# Patient Record
Sex: MALE | Race: WHITE | Employment: OTHER | URBAN - METROPOLITAN AREA
[De-identification: names, ages, dates, MRNs, and addresses within clinical notes are randomized per-mention and may not be internally consistent; named-entity substitution may affect disease eponyms.]

---

## 2018-05-29 ENCOUNTER — OFFICE VISIT (OUTPATIENT)
Dept: HEMATOLOGY | Age: 58
End: 2018-05-29

## 2018-05-29 VITALS
HEART RATE: 96 BPM | SYSTOLIC BLOOD PRESSURE: 131 MMHG | TEMPERATURE: 99 F | DIASTOLIC BLOOD PRESSURE: 63 MMHG | WEIGHT: 229.8 LBS | OXYGEN SATURATION: 97 %

## 2018-05-29 DIAGNOSIS — R60.0 LOWER LEG EDEMA: ICD-10-CM

## 2018-05-29 DIAGNOSIS — K76.82 HEPATIC ENCEPHALOPATHY: ICD-10-CM

## 2018-05-29 DIAGNOSIS — F10.21 ALCOHOLISM IN REMISSION (HCC): ICD-10-CM

## 2018-05-29 DIAGNOSIS — K76.6 PORTAL HYPERTENSION (HCC): ICD-10-CM

## 2018-05-29 DIAGNOSIS — K42.9 UMBILICAL HERNIA WITHOUT OBSTRUCTION AND WITHOUT GANGRENE: ICD-10-CM

## 2018-05-29 DIAGNOSIS — K70.31 ASCITES DUE TO ALCOHOLIC CIRRHOSIS (HCC): ICD-10-CM

## 2018-05-29 DIAGNOSIS — F10.21 ALCOHOL USE DISORDER, MODERATE, IN EARLY REMISSION (HCC): Primary | ICD-10-CM

## 2018-05-29 RX ORDER — FUROSEMIDE 40 MG/1
TABLET ORAL DAILY
COMMUNITY

## 2018-05-29 RX ORDER — PROPRANOLOL HYDROCHLORIDE 10 MG/1
TABLET ORAL 3 TIMES DAILY
COMMUNITY

## 2018-05-29 RX ORDER — CALCIUM CARBONATE/VITAMIN D3 500-10/5ML
800 LIQUID (ML) ORAL
COMMUNITY
End: 2018-06-26 | Stop reason: SDUPTHER

## 2018-05-29 RX ORDER — LANOLIN ALCOHOL/MO/W.PET/CERES
CREAM (GRAM) TOPICAL DAILY
COMMUNITY

## 2018-05-29 RX ORDER — HYDROXYZINE PAMOATE 50 MG/1
50 CAPSULE ORAL
COMMUNITY
End: 2018-05-29 | Stop reason: ALTCHOICE

## 2018-05-29 RX ORDER — CALCIUM CARBONATE 200(500)MG
TABLET,CHEWABLE ORAL
COMMUNITY
Start: 2011-05-24 | End: 2018-05-29 | Stop reason: ALTCHOICE

## 2018-05-29 RX ORDER — FOLIC ACID 1 MG/1
1 TABLET ORAL
COMMUNITY
Start: 2018-02-24

## 2018-05-29 RX ORDER — HYDROXYZINE 50 MG/1
50 TABLET, FILM COATED ORAL
COMMUNITY

## 2018-05-29 RX ORDER — SPIRONOLACTONE 100 MG/1
100 TABLET, FILM COATED ORAL DAILY
Qty: 30 TAB | Refills: 3 | Status: SHIPPED | OUTPATIENT
Start: 2018-05-29 | End: 2018-06-26 | Stop reason: SDUPTHER

## 2018-05-29 RX ORDER — OXYCODONE HYDROCHLORIDE 5 MG/1
5 CAPSULE ORAL
COMMUNITY

## 2018-05-29 RX ORDER — BENZONATATE 100 MG/1
100 CAPSULE ORAL
COMMUNITY
End: 2018-05-29 | Stop reason: ALTCHOICE

## 2018-05-29 RX ORDER — OMEPRAZOLE 20 MG/1
20 CAPSULE, DELAYED RELEASE ORAL DAILY
COMMUNITY

## 2018-05-29 RX ORDER — LACTULOSE 10 G/15ML
20 SOLUTION ORAL; RECTAL
COMMUNITY
Start: 2018-02-23

## 2018-05-29 RX ORDER — LORAZEPAM 0.5 MG/1
0.5 TABLET ORAL
COMMUNITY

## 2018-05-29 RX ORDER — ALBUTEROL SULFATE 90 UG/1
2 AEROSOL, METERED RESPIRATORY (INHALATION)
COMMUNITY

## 2018-05-29 NOTE — PROGRESS NOTES
Chief Complaint   Patient presents with   174 [facility] Patient     Cirrhosis      Visit Vitals    /63 (BP 1 Location: Left arm, BP Patient Position: Sitting)    Pulse 96    Temp 99 °F (37.2 °C) (Tympanic)    Wt 229 lb 12.8 oz (104.2 kg)    SpO2 97%     PHQ over the last two weeks 5/29/2018   Little interest or pleasure in doing things Not at all   Feeling down, depressed or hopeless Not at all   Total Score PHQ 2 0     Learning Assessment 5/29/2018   PRIMARY LEARNER Patient   BARRIERS PRIMARY LEARNER NONE   CO-LEARNER CAREGIVER No   PRIMARY LANGUAGE ENGLISH   LEARNER PREFERENCE PRIMARY LISTENING   ANSWERED BY patient    RELATIONSHIP SELF     Abuse Screening Questionnaire 5/29/2018   Do you ever feel afraid of your partner? N   Are you in a relationship with someone who physically or mentally threatens you? N   Is it safe for you to go home?  Alec Lindo

## 2018-05-30 ENCOUNTER — HOSPITAL ENCOUNTER (OUTPATIENT)
Dept: MRI IMAGING | Age: 58
Discharge: HOME | End: 2018-05-30
Attending: PHYSICIAN ASSISTANT
Payer: MEDICARE

## 2018-05-30 ENCOUNTER — HOSPITAL ENCOUNTER (OUTPATIENT)
Dept: GENERAL RADIOLOGY | Age: 58
Discharge: HOME | End: 2018-05-30
Attending: PHYSICIAN ASSISTANT
Payer: MEDICARE

## 2018-05-30 DIAGNOSIS — M54.9 BACK PAIN: ICD-10-CM

## 2018-05-30 DIAGNOSIS — M50.10 CERVICAL DISC SYNDROME: ICD-10-CM

## 2018-05-30 DIAGNOSIS — R53.1 ASTHENIA: ICD-10-CM

## 2018-05-30 LAB
ALBUMIN SERPL-MCNC: 3.1 G/DL (ref 3.5–5.5)
ALBUMIN/GLOB SERPL: 0.8 {RATIO} (ref 1.2–2.2)
ALP SERPL-CCNC: 312 IU/L (ref 39–117)
ALT SERPL-CCNC: 16 IU/L (ref 0–44)
AMMONIA PLAS-MCNC: 201 UG/DL (ref 27–102)
AST SERPL-CCNC: 46 IU/L (ref 0–40)
BILIRUB SERPL-MCNC: 1.4 MG/DL (ref 0–1.2)
BUN SERPL-MCNC: 8 MG/DL (ref 6–24)
BUN/CREAT SERPL: 10 (ref 9–20)
CALCIUM SERPL-MCNC: 8.9 MG/DL (ref 8.7–10.2)
CHLORIDE SERPL-SCNC: 91 MMOL/L (ref 96–106)
CO2 SERPL-SCNC: 25 MMOL/L (ref 18–29)
CREAT SERPL-MCNC: 0.81 MG/DL (ref 0.76–1.27)
ERYTHROCYTE [DISTWIDTH] IN BLOOD BY AUTOMATED COUNT: 14.9 % (ref 12.3–15.4)
ETHANOL BLD GC-MCNC: NEGATIVE %
GFR SERPLBLD CREATININE-BSD FMLA CKD-EPI: 115 ML/MIN/1.73
GFR SERPLBLD CREATININE-BSD FMLA CKD-EPI: 99 ML/MIN/1.73
GLOBULIN SER CALC-MCNC: 4.1 G/DL (ref 1.5–4.5)
GLUCOSE SERPL-MCNC: 97 MG/DL (ref 65–99)
HCT VFR BLD AUTO: 30.9 % (ref 37.5–51)
HGB BLD-MCNC: 10.1 G/DL (ref 13–17.7)
INR PPP: 1.1 (ref 0.8–1.2)
MCH RBC QN AUTO: 31.6 PG (ref 26.6–33)
MCHC RBC AUTO-ENTMCNC: 32.7 G/DL (ref 31.5–35.7)
MCV RBC AUTO: 97 FL (ref 79–97)
PLATELET # BLD AUTO: 213 X10E3/UL (ref 150–379)
POTASSIUM SERPL-SCNC: 4.7 MMOL/L (ref 3.5–5.2)
PROT SERPL-MCNC: 7.2 G/DL (ref 6–8.5)
PROTHROMBIN TIME: 11.8 SEC (ref 9.1–12)
RBC # BLD AUTO: 3.2 X10E6/UL (ref 4.14–5.8)
SODIUM SERPL-SCNC: 131 MMOL/L (ref 134–144)
WBC # BLD AUTO: 9.4 X10E3/UL (ref 3.4–10.8)

## 2018-05-30 PROCEDURE — 72052 X-RAY EXAM NECK SPINE 6/>VWS: CPT

## 2018-05-30 PROCEDURE — 72100 X-RAY EXAM L-S SPINE 2/3 VWS: CPT

## 2018-05-31 PROBLEM — K42.9 UMBILICAL HERNIA WITHOUT OBSTRUCTION AND WITHOUT GANGRENE: Status: ACTIVE | Noted: 2018-05-31

## 2018-05-31 PROBLEM — R60.0 LOWER LEG EDEMA: Status: ACTIVE | Noted: 2018-05-31

## 2018-06-04 ENCOUNTER — HOSPITAL ENCOUNTER (OUTPATIENT)
Dept: MRI IMAGING | Age: 58
Discharge: HOME | End: 2018-06-04
Attending: PHYSICIAN ASSISTANT
Payer: MEDICARE

## 2018-06-04 ENCOUNTER — HOSPITAL ENCOUNTER (OUTPATIENT)
Dept: MRI IMAGING | Age: 58
Discharge: HOME | End: 2018-06-04
Attending: PAIN MEDICINE
Payer: MEDICARE

## 2018-06-04 ENCOUNTER — HOSPITAL ENCOUNTER (OUTPATIENT)
Dept: ULTRASOUND IMAGING | Age: 58
Discharge: HOME | End: 2018-06-04
Attending: NURSE PRACTITIONER
Payer: MEDICARE

## 2018-06-04 DIAGNOSIS — F10.21 ALCOHOLISM IN REMISSION (HCC): ICD-10-CM

## 2018-06-04 DIAGNOSIS — M48.56XA COLLAPSE OF LUMBAR VERTEBRA (HCC): ICD-10-CM

## 2018-06-04 DIAGNOSIS — K76.82 HEPATIC ENCEPHALOPATHY: ICD-10-CM

## 2018-06-04 DIAGNOSIS — K70.31 ASCITES DUE TO ALCOHOLIC CIRRHOSIS (HCC): ICD-10-CM

## 2018-06-04 DIAGNOSIS — F10.21 ALCOHOL USE DISORDER, MODERATE, IN EARLY REMISSION (HCC): ICD-10-CM

## 2018-06-04 PROCEDURE — 72141 MRI NECK SPINE W/O DYE: CPT

## 2018-06-04 PROCEDURE — 76705 ECHO EXAM OF ABDOMEN: CPT

## 2018-06-04 PROCEDURE — 72148 MRI LUMBAR SPINE W/O DYE: CPT

## 2018-06-26 RX ORDER — CALCIUM CARBONATE/VITAMIN D3 500-10/5ML
800 LIQUID (ML) ORAL DAILY
Qty: 90 CAP | Refills: 3 | Status: SHIPPED | OUTPATIENT
Start: 2018-06-26

## 2018-06-26 RX ORDER — SPIRONOLACTONE 100 MG/1
100 TABLET, FILM COATED ORAL DAILY
Qty: 30 TAB | Refills: 3 | Status: SHIPPED | OUTPATIENT
Start: 2018-06-26

## 2018-06-26 NOTE — TELEPHONE ENCOUNTER
Requested Prescriptions     Pending Prescriptions Disp Refills    spironolactone (ALDACTONE) 100 mg tablet 30 Tab 3     Sig: Take 1 Tab by mouth daily.  magnesium oxide 400 mg cap       Sig: Take 800 mg by mouth.

## 2018-07-02 ENCOUNTER — OFFICE VISIT (OUTPATIENT)
Dept: HEMATOLOGY | Age: 58
End: 2018-07-02

## 2018-07-02 VITALS
OXYGEN SATURATION: 96 % | HEART RATE: 97 BPM | DIASTOLIC BLOOD PRESSURE: 64 MMHG | TEMPERATURE: 97 F | SYSTOLIC BLOOD PRESSURE: 138 MMHG | WEIGHT: 219 LBS

## 2018-07-02 DIAGNOSIS — K42.9 UMBILICAL HERNIA WITHOUT OBSTRUCTION AND WITHOUT GANGRENE: ICD-10-CM

## 2018-07-02 DIAGNOSIS — F10.21 ALCOHOLISM IN REMISSION (HCC): ICD-10-CM

## 2018-07-02 DIAGNOSIS — D64.9 ANEMIA, UNSPECIFIED TYPE: ICD-10-CM

## 2018-07-02 DIAGNOSIS — K76.82 HEPATIC ENCEPHALOPATHY: ICD-10-CM

## 2018-07-02 DIAGNOSIS — R60.0 LOWER LEG EDEMA: ICD-10-CM

## 2018-07-02 DIAGNOSIS — F10.21 ALCOHOL USE DISORDER, MODERATE, IN EARLY REMISSION (HCC): Primary | ICD-10-CM

## 2018-07-02 NOTE — PROGRESS NOTES
1. Have you been to the ER, urgent care clinic since your last visit? Hospitalized since your last visit? No    2. Have you seen or consulted any other health care providers outside of the [address] [place] [address] since your last visit? Include any pap smears or colon screening. No   Chief Complaint   Patient presents with    Follow-up     4 week follow up      Visit Vitals    /64 (BP 1 Location: Left arm, BP Patient Position: Sitting)    Pulse 97    Temp 97 °F (36.1 °C) (Tympanic)    Wt 219 lb (99.3 kg)    SpO2 96%     PHQ over the last two weeks 7/2/2018   Little interest or pleasure in doing things Not at all   Feeling down, depressed or hopeless Not at all   Total Score PHQ 2 0     Learning Assessment 7/2/2018   PRIMARY LEARNER Patient   BARRIERS PRIMARY LEARNER NONE   CO-LEARNER CAREGIVER No   PRIMARY LANGUAGE ENGLISH   LEARNER PREFERENCE PRIMARY LISTENING   ANSWERED BY patient    RELATIONSHIP SELF     Abuse Screening Questionnaire 7/2/2018   Do you ever feel afraid of your partner? N   Are you in a relationship with someone who physically or mentally threatens you? N   Is it safe for you to go home?  Aldo Rodriguez

## 2018-07-02 NOTE — MR AVS SNAPSHOT
1111 [facility] Jero [phone] [address] [place] [address] 
[phone] Patient: Landen Cat MRN: [id number] HOL:[birth year] Visit Information Date & Time Provider Department Dept. Phone Encounter #  
 7/2/2018 12:45 PM April G Anival Mora, [facility] 219 [id number] Follow-up Instructions Return in about 4 weeks (around 7/30/2018). Your Appointments 8/2/2018  1:30 PM  
PROCEDURE with MD Willard De Jesus 75 3651 [facility]) Appt Note: LBX per Anival Mora 200 [facility] [phone] Critical access hospital [id number]  
59 [facility] Jero [address] Upcoming Health Maintenance Date Due Hepatitis C Screening 1960 Pneumococcal 19-64 Medium Risk (1 of 1 - PPSV23) 7/20/1980 DTaP/Tdap/Td series (1 - Tdap) 7/20/1982 FOBT Q 1 YEAR AGE 50-75 7/20/2011 MEDICARE YEARLY EXAM 4/24/2018 Influenza Age 5 to Adult 8/1/2018 Allergies as of 7/2/2018  Review Complete On: 7/2/2018 By: Aleyda Forrester Severity Noted Reaction Type Reactions Aspirin High 05/24/2011    Anaphylaxis Current Immunizations  Never Reviewed No immunizations on file. Not reviewed this visit You Were Diagnosed With   
  
 Codes Comments Alcohol use disorder, moderate, in early remission ([facility]    -  Primary ICD-10-CM: Y57.24 ICD-9-CM: 305.03 Alcoholism in remission [facility])     ICD-10-CM: D44.47 ICD-9-CM: 303.93 Lower leg edema     ICD-10-CM: R60.0 ICD-9-CM: 782.3 Hepatic encephalopathy ([facility]     ICD-10-CM: K72.90 ICD-9-CM: 572.2 Umbilical hernia without obstruction and without gangrene     ICD-10-CM: K42.9 ICD-9-CM: 553.1 Vitals BP Pulse Temp Weight(growth percentile) SpO2 Smoking Status 138/64 (BP 1 Location: Left arm, BP Patient Position: Sitting) 97 97 °F (36.1 °C) (Tympanic) 219 lb (99.3 kg) 96% Former Smoker Preferred Pharmacy Pharmacy Name Phone [id number] [place], [address] [facility] [phone] Your Updated Medication List  
  
   
This list is accurate as of 7/2/18  1:12 PM.  Always use your most recent med list.  
  
  
  
  
 albuterol 90 mcg/actuation inhaler Commonly known as:  PROVENTIL HFA, VENTOLIN HFA, PROAIR HFA Take 2 Puffs by inhalation. folic acid 1 mg tablet Commonly known as:  Google Take 1 mg by mouth. furosemide 40 mg tablet Commonly known as:  LASIX Take  by mouth daily. hydrOXYzine HCl 50 mg tablet Commonly known as:  ATARAX Take 50 mg by mouth three (3) times daily as needed for Itching. lactulose 10 gram/15 mL solution Commonly known as:  Leverne Manners Take 20 g by mouth. LORazepam 0.5 mg tablet Commonly known as:  ATIVAN Take 0.5 mg by mouth three (3) times daily as needed for Anxiety. Indications: anxiety  
  
 magnesium oxide 400 mg Cap Take 800 mg by mouth daily. omeprazole 20 mg capsule Commonly known as:  PRILOSEC Take 20 mg by mouth daily. oxyCODONE 5 mg capsule Commonly known as:  OXYIR Take 5 mg by mouth every four (4) hours as needed. propranolol 10 mg tablet Commonly known as:  INDERAL Take  by mouth three (3) times daily. [address] [place], [place] [address]way Take  by inhalation. spironolactone 100 mg tablet Commonly known as:  ALDACTONE Take 1 Tab by mouth daily. VITAMIN B-1 100 mg tablet Generic drug:  thiamine Take  by mouth daily. We Performed the Following AMMONIA [id number] CPT(R)] CBC W/O DIFF [10710 CPT(R)] FERRITIN [14421 CPT(R)] IRON PROFILE [id number] CPT(R)] METABOLIC PANEL, COMPREHENSIVE [19668 CPT(R)] PROTHROMBIN TIME + INR [43763 CPT(R)] Follow-up Instructions Return in about 4 weeks (around 7/30/2018). Introducing [facility] SERVICES! [facility] introduces Only-apartments patient portal. Now you can access parts of your medical record, email your doctor's office, and request medication refills online. 1. In your internet browser, go to [email or web address] 2. Click on the First Time User? Click Here link in the Sign In box. You will see the New Member Sign Up page. 3. Enter your Only-apartments Access Code exactly as it appears below. You will not need to use this code after youve completed the sign-up process. If you do not sign up before the expiration date, you must request a new code. · Only-apartments Access Code: [email or web address] Expires: 8/27/2018  2:41 PM 
 
4. Enter the last four digits of your Social Security Number (xxxx) and Date of Birth (mm/dd/yyyy) as indicated and click Submit. You will be taken to the next sign-up page. 5. Create a Only-apartments ID. This will be your Only-apartments login ID and cannot be changed, so think of one that is secure and easy to remember. 6. Create a Only-apartments password. You can change your password at any time. 7. Enter your Password Reset Question and Answer. This can be used at a later time if you forget your password. 8. Enter your e-mail address. You will receive e-mail notification when new information is available in [address] 9. Click Sign Up. You can now view and download portions of your medical record. 10. Click the Download Summary menu link to download a portable copy of your medical information. If you have questions, please visit the Frequently Asked Questions section of the Only-apartments website. Remember, Only-apartments is NOT to be used for urgent needs. For medical emergencies, dial 911. Now available from your iPhone and Android! Please provide this summary of care documentation to your next provider. Your primary care clinician is listed as Ivelisse [place]. If you have any questions after today's visit, please call [phone].

## 2018-07-03 LAB
ALBUMIN SERPL-MCNC: 3.6 G/DL (ref 3.5–5.5)
ALBUMIN/GLOB SERPL: 1 {RATIO} (ref 1.2–2.2)
ALP SERPL-CCNC: 240 IU/L (ref 39–117)
ALT SERPL-CCNC: 25 IU/L (ref 0–44)
AMMONIA PLAS-MCNC: 259 UG/DL (ref 27–102)
AST SERPL-CCNC: 51 IU/L (ref 0–40)
BILIRUB SERPL-MCNC: 1.3 MG/DL (ref 0–1.2)
BUN SERPL-MCNC: 14 MG/DL (ref 6–24)
BUN/CREAT SERPL: 12 (ref 9–20)
CALCIUM SERPL-MCNC: 9.4 MG/DL (ref 8.7–10.2)
CHLORIDE SERPL-SCNC: 83 MMOL/L (ref 96–106)
CO2 SERPL-SCNC: 23 MMOL/L (ref 20–29)
CREAT SERPL-MCNC: 1.15 MG/DL (ref 0.76–1.27)
ERYTHROCYTE [DISTWIDTH] IN BLOOD BY AUTOMATED COUNT: 15.3 % (ref 12.3–15.4)
FERRITIN SERPL-MCNC: 456 NG/ML (ref 30–400)
GLOBULIN SER CALC-MCNC: 3.6 G/DL (ref 1.5–4.5)
GLUCOSE SERPL-MCNC: 96 MG/DL (ref 65–99)
HCT VFR BLD AUTO: 29.3 % (ref 37.5–51)
HGB BLD-MCNC: 10 G/DL (ref 13–17.7)
INR PPP: 1.1 (ref 0.8–1.2)
IRON SATN MFR SERPL: 19 % (ref 15–55)
IRON SERPL-MCNC: 42 UG/DL (ref 38–169)
MCH RBC QN AUTO: 31.9 PG (ref 26.6–33)
MCHC RBC AUTO-ENTMCNC: 34.1 G/DL (ref 31.5–35.7)
MCV RBC AUTO: 94 FL (ref 79–97)
PLATELET # BLD AUTO: 247 X10E3/UL (ref 150–379)
POTASSIUM SERPL-SCNC: 5 MMOL/L (ref 3.5–5.2)
PROT SERPL-MCNC: 7.2 G/DL (ref 6–8.5)
PROTHROMBIN TIME: 11.5 SEC (ref 9.1–12)
RBC # BLD AUTO: 3.13 X10E6/UL (ref 4.14–5.8)
SODIUM SERPL-SCNC: 123 MMOL/L (ref 134–144)
TIBC SERPL-MCNC: 221 UG/DL (ref 250–450)
UIBC SERPL-MCNC: 179 UG/DL (ref 111–343)
WBC # BLD AUTO: 8.9 X10E3/UL (ref 3.4–10.8)

## 2018-07-03 NOTE — PROGRESS NOTES
Called patient to adjust diuretics. He will now take Lasix 20 mg and Aldactone 50 mg due to hyponatremia. Repeat labs in 2 weeks.     April

## 2018-07-05 PROBLEM — D64.9 ANEMIA: Status: ACTIVE | Noted: 2018-07-05

## 2018-07-05 NOTE — PROGRESS NOTES
70 Tri Mari MD, [address], [place], [place]       HAN Huerta PA-C Louetta Peace, MINDIP-BC   Librado Macias, HAN Patel [facility] [address]    at [place] [address] [address], [address] [address]  22.    [phone]    FAX: [address] [place] [address], [address]    [phone]    FAX: [phone]     Patient Care Team:  Michelle Faustin MD as PCP - General (Family Practice)  Robson Moreno MD as Physician (Gastroenterology)    Patient Active Problem List   Diagnosis Code    Alcohol use disorder, moderate, in early remission ([place] [facility] F10.21    Alcoholism in remission ([facility] F10.21    Hepatic encephalopathy ([facility] K72.90    Lower leg edema B50.3    Umbilical hernia without obstruction and without gangrene K42.9       Man Simon returns to the [employer] regarding management of cirrhosis secondary to alcohol. The active problem list, all pertinent past medical history, medications, radiologic findings and laboratory findings related to the liver disorder were reviewed with the patient. The patient is a 65 y.o.  male who was found to have chronic liver disease in 2013. At that time, he was drinking 6-12 beers daily. He was told to discontinue all alcohol use so he cut back at that time and eventually stopped. He then started drinking 2 glasses of wine daily in 2017. He developed bilateral LE edema in October 2017 but didn't know why. In February 2018, he fell, injured his knee and presented to the ED. He was found to have elevated liver enzymes and ascites. A paracentesis was required. He was hospitalized for 1 month.     An assessment of liver fibrosis with biopsy or elastography has not been performed. The patient has developed the following complications of cirrhosis:  ascites, LE edema and hepatic encephalopathy. The patient notes fatigue and umbilical hernia. These symptoms have not changed compared to last office visit. Today, patient denies abdominal pain, change in bowel habits, dark urine, myalgias, arthralgias, pruritus and problems concentrating. He has moderate limitations in functional activities which some can be attributed to the liver disease and to other medical problems that are not related to the liver disease. All of the issues listed in the Assessment and Plan were discussed with the patient. All questions were answered. The patient expressed a clear understanding of the above. ASSESSMENT AND PLAN:  Cirrhosis  This is secondary to alcohol abuse. Liver function is mildly depressed. Liver enzymes are mildly elevated. Platelet count is normal.  Child class B. The MELD score is 9. The patient does not require liver transplant evaluation at this time. The patient would not be a candidate for liver transplantation because of recent alcohol use in February 2018. The patient will have been abstinent for 6 months in August 2018. Ascites   There is no obvious ascites on exam today. Patient has had a 10 lbs weight loss over the last month with increasing his diuretics to step 1. He is hyponatremic so diuretics will be reduced to 1/2 step. He will repeat labs in 1-2 weeks. The patient was counseled regarding the need to maintain sodium restriction and the types of foods containing high amounts of sodium to be avoided. Lower extremity edema  Improved with step 1 diuretics. Reduced to 1/2 step due to hyponatremia. We discussed sodium restriction, not fluid restriction. He verbalized understanding of the importance of following a sodium restricted diet.     Hyponatremia  Sodium level is 123 today with increasing his diuretics to step 1 in May 2018. Instructed patient to reduce diuretics by half. Will recheck labs in 1-2 weeks to make sure this improves. Screening for Esophageal varices   The patient has not recently had an EGD to screen for varices. EGD has been scheduled for August 2018 to assess for varices and need for banding. Hepatic encephalopathy   Controlled on current doses of lactulose 30 mL BID. Continue. No need to restrict dietary protein at this time. Treatment of other medical problems in patients with chronic liver disease  The patient was directed to continue all current medications at the current dosages. The patient recently consumed alcohol on a regular basis. This increases the risk of toxicity from acetaminophen. This analgesic should be avoided until the patient has been abstinent from alcohol for 6 months. The patient has cirrhosis. Patients with cirrhosis should not use NSAIDs if possible as this is associated with a higher rate of VENU. Counseling for alcohol in patients with chronic liver disease  The patient has cirrhosis and was advised to be abstinent from all alcohol including non-alcoholic beer which does contain some alcohol. The patient has not consumed alcohol since February 2018. The patient has an alcohol abuse disorder and it was suggested to enter alcohol counseling or attend AA. Anemia   This is of unclear etiology. EGD has been scheduled to assess for UGI blood loss. It is not worsening over time. Vaccinations   Viral hepatitis B is recommended since the patient has no serologic evidence of previous exposure or vaccination with immunity. Vaccination for viral hepatitis A is not needed. The patient has serologic evidence of prior exposure or vaccination with immunity. Screening for Hepatocellular Carcinoma  HCC screening. AFP is normal. Currently up to date with an unremarkable US in June 2018. Next imaging will be in December 2018.     [facility] 87 in 4 weeks     ALLERGIES  Allergies   Allergen Reactions    Aspirin Anaphylaxis     MEDICATIONS  Current Outpatient Prescriptions   Medication Sig    spironolactone (ALDACTONE) 100 mg tablet Take 1 Tab by mouth daily.  magnesium oxide 400 mg cap Take 800 mg by mouth daily.  albuterol (PROVENTIL HFA, VENTOLIN HFA, PROAIR HFA) 90 mcg/actuation inhaler Take 2 Puffs by inhalation.  folic acid (FOLVITE) 1 mg tablet Take 1 mg by mouth.  lactulose (CHRONULAC) 10 gram/15 mL solution Take 20 g by mouth.  oxyCODONE (OXYIR) 5 mg capsule Take 5 mg by mouth every four (4) hours as needed.  hydrOXYzine HCl (ATARAX) 50 mg tablet Take 50 mg by mouth three (3) times daily as needed for Itching.  propranolol (INDERAL) 10 mg tablet Take  by mouth three (3) times daily.  thiamine (VITAMIN B-1) 100 mg tablet Take  by mouth daily.  furosemide (LASIX) 40 mg tablet Take  by mouth daily.  omeprazole (PRILOSEC) 20 mg capsule Take 20 mg by mouth daily.  tiotropium bromide (SPIRIVA WITH HANDIHALER IN) Take  by inhalation.  LORazepam (ATIVAN) 0.5 mg tablet Take 0.5 mg by mouth three (3) times daily as needed for Anxiety. Indications: anxiety     No current facility-administered medications for this visit. FAMILY HISTORY:  There is no family history of liver disease. Mother: alive, osteoarthritis  Father: , cardiovascular disease    SOCIAL HISTORY:  The patient is . The patient has 3 children and 2 grandchildren. The patient does not use tobacco products. The patient has previously consumed alcohol in excess, 6-12 beers x > 30 years. The patient has been abstinent from alcohol since 2018. The patient does not work. He used to work in construction. PHYSICAL EXAMINATION:  Visit Vitals    /64 (BP 1 Location: Left arm, BP Patient Position: Sitting)    Pulse 97    Temp 97 °F (36.1 °C) (Tympanic)    Wt 219 lb (99.3 kg)    SpO2 96%     General: No acute distress. Eyes: Sclera anicteric. ENT: No oral lesions. Thyroid normal.  Nodes: No adenopathy. Skin: No spider angiomata. No jaundice. No palmar erythema. Respiratory: Lungs clear to auscultation. Cardiovascular: Regular heart rate. No murmurs. No JVD. Abdomen: Umbilical hernia, no discoloration, reducible, non-tender. Liver size normal to percussion/palpation. Spleen not palpable. No obvious ascites. Extremities: No edema. No muscle wasting. No gross arthritic changes. Neurologic: Alert and oriented. Cranial nerves grossly intact. No asterixis. LABORATORY STUDIES:  [facility] [place] [address] & Units 7/2/2018 5/29/2018   WBC 3.4 - 10.8 x10E3/uL 8.9 9.4   HGB 13.0 - 17.7 g/dL 10.0 (L) 10.1 (L)    - 379 x10E3/uL 247 213   INR 0.8 - 1.2 1.1 1.1   AST 0 - 40 IU/L 51 (H) 46 (H)   ALT 0 - 44 IU/L 25 16   Alk Phos 39 - 117 IU/L 240 (H) 312 (H)   Bili, Total 0.0 - 1.2 mg/dL 1.3 (H) 1.4 (H)   Albumin 3.5 - 5.5 g/dL 3.6 3.1 (L)   BUN 6 - 24 mg/dL 14 8   Creat 0.76 - 1.27 mg/dL 1.15 0.81   Na 134 - 144 mmol/L 123 (L) 131 (L)   K 3.5 - 5.2 mmol/L 5.0 4.7   Cl 96 - 106 mmol/L 83 (L) 91 (L)   CO2 20 - 29 mmol/L 23 25   Glucose 65 - 99 mg/dL 96 97   Ammonia 27 - 102 ug/dL 259 (HH) 201 (HH)     SEROLOGIES:  4/19/2018:   Ceruloplasmin 28. 0/Ferritin 378/Iron saturation 36%/HAV Ab positive/HBVsAb non reactive/HBsAg negative/HCV Ab <0.1/SAADIA positive/ASMA 7 (negative)/AMA 14.0 (negative)/AFP 6.4    Serologies Latest Ref Rng & Units 7/2/2018   Ferritin 30 - 400 ng/mL 456 (H)   Iron % Saturation 15 - 55 % 19     LIVER HISTOLOGY:  Not available or performed    ENDOSCOPIC PROCEDURES:  4/2018. Colonoscopy by Dr. Kusum Boss. 5 mm polyp, hemorrhoids     RADIOLOGY:  6/2018. US of liver. Enlarged approximately 22 cm in length. Heterogeneous echotexture, no definite focal liver lesion. Main portal vein is patent. No ascites mentioned.      OTHER TESTING:  Not available or performed    April Rao Brown NP  [facility] [place] [facility], [facility], [address] [place] [address]  Ph: [phone]  Fax: [phone]

## 2018-07-20 ENCOUNTER — HOSPITAL ENCOUNTER (OUTPATIENT)
Dept: MRI IMAGING | Age: 58
Discharge: HOME | End: 2018-07-20
Attending: PAIN MEDICINE
Payer: MEDICARE

## 2018-07-20 ENCOUNTER — HOSPITAL ENCOUNTER (OUTPATIENT)
Dept: BONE DENSITY | Age: 58
Discharge: HOME | End: 2018-07-20
Attending: PAIN MEDICINE
Payer: MEDICARE

## 2018-07-20 DIAGNOSIS — M48.56XA COLLAPSE OF LUMBAR VERTEBRA (HCC): ICD-10-CM

## 2018-07-20 PROCEDURE — 72146 MRI CHEST SPINE W/O DYE: CPT

## 2018-07-20 PROCEDURE — 77080 DXA BONE DENSITY AXIAL: CPT

## 2018-08-28 ENCOUNTER — OFFICE VISIT (OUTPATIENT)
Dept: HEMATOLOGY | Age: 58
End: 2018-08-28

## 2018-08-28 VITALS
WEIGHT: 215 LBS | TEMPERATURE: 99 F | SYSTOLIC BLOOD PRESSURE: 130 MMHG | OXYGEN SATURATION: 97 % | HEART RATE: 104 BPM | DIASTOLIC BLOOD PRESSURE: 59 MMHG

## 2018-08-28 DIAGNOSIS — E87.1 HYPONATREMIA: ICD-10-CM

## 2018-08-28 DIAGNOSIS — K76.82 HEPATIC ENCEPHALOPATHY: ICD-10-CM

## 2018-08-28 DIAGNOSIS — D64.9 ANEMIA, UNSPECIFIED TYPE: ICD-10-CM

## 2018-08-28 DIAGNOSIS — F10.21 ALCOHOL USE DISORDER, MODERATE, IN EARLY REMISSION (HCC): Primary | ICD-10-CM

## 2018-08-28 DIAGNOSIS — K70.11 ASCITES DUE TO ALCOHOLIC HEPATITIS: ICD-10-CM

## 2018-08-28 DIAGNOSIS — R60.0 LOWER LEG EDEMA: ICD-10-CM

## 2018-08-28 DIAGNOSIS — K42.9 UMBILICAL HERNIA WITHOUT OBSTRUCTION AND WITHOUT GANGRENE: ICD-10-CM

## 2018-08-28 DIAGNOSIS — F10.21 ALCOHOLISM IN REMISSION (HCC): ICD-10-CM

## 2018-08-28 RX ORDER — TIOTROPIUM BROMIDE 18 UG/1
CAPSULE ORAL; RESPIRATORY (INHALATION)
Refills: 0 | COMMUNITY
Start: 2018-08-21

## 2018-08-28 RX ORDER — MOMETASONE FUROATE AND FORMOTEROL FUMARATE DIHYDRATE 200; 5 UG/1; UG/1
AEROSOL RESPIRATORY (INHALATION)
Refills: 0 | COMMUNITY
Start: 2018-08-20

## 2018-08-28 RX ORDER — MORPHINE SULFATE 15 MG/1
TABLET, FILM COATED, EXTENDED RELEASE ORAL
Refills: 0 | COMMUNITY
Start: 2018-08-22

## 2018-08-28 RX ORDER — OXYCODONE HYDROCHLORIDE 10 MG/1
TABLET ORAL
Refills: 0 | COMMUNITY
Start: 2018-08-22 | End: 2018-08-28 | Stop reason: DRUGHIGH

## 2018-08-28 RX ORDER — TORSEMIDE 20 MG/1
TABLET ORAL
Refills: 4 | COMMUNITY
Start: 2018-08-22

## 2018-08-28 NOTE — PROGRESS NOTES
1. Have you been to the ER, urgent care clinic since your last visit? Hospitalized since your last visit? No    2. Have you seen or consulted any other health care providers outside of the [facility] since your last visit? Include any pap smears or colon screening. No   Chief Complaint   Patient presents with    Follow-up     Visit Vitals    /59 (BP 1 Location: Left arm, BP Patient Position: Sitting)    Pulse (!) 104    Temp 99 °F (37.2 °C) (Tympanic)    Wt 215 lb (97.5 kg)    SpO2 97%     PHQ over the last two weeks 8/28/2018   Little interest or pleasure in doing things Not at all   Feeling down, depressed, irritable, or hopeless Not at all   Total Score PHQ 2 0     Learning Assessment 8/28/2018   PRIMARY LEARNER Patient   BARRIERS PRIMARY LEARNER NONE   CO-LEARNER CAREGIVER No   PRIMARY LANGUAGE ENGLISH   LEARNER PREFERENCE PRIMARY LISTENING   ANSWERED BY patient    RELATIONSHIP SELF     Abuse Screening Questionnaire 8/28/2018   Do you ever feel afraid of your partner? N   Are you in a relationship with someone who physically or mentally threatens you? N   Is it safe for you to go home?

## 2018-08-28 NOTE — MR AVS SNAPSHOT
2700 [facility] [phone] [address] [place] [address] 
[phone] Patient: Lizzie Aguirre MRN: [id number] AFE:[id number] Visit Information Date & Time Provider Department Dept. Phone Encounter #  
 8/28/2018  1:30 PM Vandana Markham, 3687 [facility] 219 [id number] Follow-up Instructions Return in about 2 months (around 10/28/2018). Your Appointments 10/25/2018 12:45 PM  
Follow Up with HAN Medrano 75 (3651 [facility]) Appt Note: Follow up 200 [facility] [phone] [facility] [id number]  
59 [facility] Upcoming Health Maintenance Date Due Pneumococcal 19-64 Medium Risk (1 of 1 - PPSV23) 7/20/1980 DTaP/Tdap/Td series (1 - Tdap) 7/20/1982 FOBT Q 1 YEAR AGE 50-75 7/20/2011 MEDICARE YEARLY EXAM 4/24/2018 Influenza Age 5 to Adult 8/1/2018 Allergies as of 8/28/2018  Review Complete On: 8/28/2018 By: Vandana Nicole NP Severity Noted Reaction Type Reactions Aspirin High 05/24/2011    Anaphylaxis Current Immunizations  Never Reviewed No immunizations on file. Not reviewed this visit You Were Diagnosed With   
  
 Codes Comments Alcohol use disorder, moderate, in early remission ([facility]    -  Primary ICD-10-CM: V26.55 ICD-9-CM: 305.03 Hepatic encephalopathy ([facility].)     ICD-10-CM: K72.90 ICD-9-CM: 572.2 Lower leg edema     ICD-10-CM: R60.0 ICD-9-CM: 832. 3 Umbilical hernia without obstruction and without gangrene     ICD-10-CM: K42.9 ICD-9-CM: 553.1 Alcoholism in remission [facility])     ICD-10-CM: H66.12 ICD-9-CM: 303.93 Ascites due to alcoholic hepatitis     [facility]-14-BD: K70.11 ICD-9-CM: 789.59 Vitals BP Pulse Temp Weight(growth percentile) SpO2 Smoking Status  130/59 (BP 1 Location: Left arm, BP Patient Position: Sitting) (!) 104 99 °F (37.2 °C) (Tympanic) 215 lb (97.5 kg) 97% Former Smoker Preferred Pharmacy Pharmacy Name Phone [facility][id number] Desirae Galeano, [address] [facility] [phone] Your Updated Medication List  
  
   
This list is accurate as of 8/28/18  1:50 PM.  Always use your most recent med list.  
  
  
  
  
 albuterol 90 mcg/actuation inhaler Commonly known as:  PROVENTIL HFA, VENTOLIN HFA, PROAIR HFA Take 2 Puffs by inhalation. DULERA 200-5 mcg/actuation HFA inhaler Generic drug:  mometasone-formoterol INHALE 2 PUFFS BY MOUTH TWICE DAILY  
  
 folic acid 1 mg tablet Commonly known as:  Google Take 1 mg by mouth. furosemide 40 mg tablet Commonly known as:  LASIX Take  by mouth daily. hydrOXYzine HCl 50 mg tablet Commonly known as:  ATARAX Take 50 mg by mouth three (3) times daily as needed for Itching. lactulose 10 gram/15 mL solution Commonly known as:  Swansea Hoots Take 20 g by mouth. LORazepam 0.5 mg tablet Commonly known as:  ATIVAN Take 0.5 mg by mouth three (3) times daily as needed for Anxiety. Indications: anxiety  
  
 magnesium oxide 400 mg Cap Take 800 mg by mouth daily. morphine CR 15 mg CR tablet Commonly known as:  MS CONTIN  
TAKE 1 TABLET BY MOUTH EVERY DAY  
  
 omeprazole 20 mg capsule Commonly known as:  PRILOSEC Take 20 mg by mouth daily. oxyCODONE 5 mg capsule Commonly known as:  OXYIR Take 5 mg by mouth every four (4) hours as needed. propranolol 10 mg tablet Commonly known as:  INDERAL Take  by mouth three (3) times daily. * [address] [place] [address] Take  by inhalation. * SPIRIVA WITH HANDIHALER 18 mcg inhalation capsule Generic drug:  tiotropium 1 CAPSULE ONCE A DAY INHALATION 30  
  
 spironolactone 100 mg tablet Commonly known as:  ALDACTONE Take 1 Tab by mouth daily. torsemide 20 mg tablet Commonly known as:  DEMADEX TAKE 1 TABLET BY MOUTH TWICE A DAY  
  
 VITAMIN B-1 100 mg tablet Generic drug:  thiamine HCL Take  by mouth daily. * Notice: This list has 2 medication(s) that are the same as other medications prescribed for you. Read the directions carefully, and ask your doctor or other care provider to review them with you. We Performed the Following AMMONIA [id number] CPT(R)] CBC W/O DIFF [61392 CPT(R)] CELL COUNT AND DIFF, BODY FLUID [29116 CPT(R)] MAGNESIUM [id number] CPT(R)] METABOLIC PANEL, COMPREHENSIVE [42910 CPT(R)] PROTHROMBIN TIME + INR [12218 CPT(R)] Follow-up Instructions Return in about 2 months (around 10/28/2018). To-Do List   
 08/28/2018 Imaging:  US PARACENTESIS ABD W IMAGING   
  
 09/06/2018 1:00 PM  
  Appointment with Vitaliy Gao MD; [facility] US ER 1 at [address] ([phone] GENERAL INSTRUCTIONS 1. Bring any non [facility] facility films/reports pertaining to the area being studied with you on the day of appointment. 2. Bring a list of all medications you are currently taking, including over the counter medications. 3. A written order with a valid diagnosis and Physicians signature is required for all scheduled tests. 4. Blood thinners and platelet inhibitors should be stopped 3-5 days prior to procedure. Consult your ordering physician prior to stopping them. 5. Check in at registration 30 minutes before your appointment time unless you were instructed to do otherwise. 6. A  is required for the procedure. For some patients, there may be some unsteadiness post procedure. If no  is present, the patient may have to remain in the department for a longer period of time. 7. The procedure may last 1-1 ½ hours. You may be required to stay 4-6 hours after the procedure for observation.   --Lab work for bleeding times (INR, PT , PTT and platelets) should be be completed at least the day before the exam.  Without this information the patient is delayed or  rescheduled. Introducing [facility]! [employer] introduces [employer] patient portal. Now you can access parts of your medical record, email your doctor's office, and request medication refills online. 1. In your internet browser, go to [email or web address] 2. Click on the First Time User? Click Here link in the Sign In box. You will see the New Member Sign Up page. 3. Enter your [employer] Access Code exactly as it appears below. You will not need to use this code after youve completed the sign-up process. If you do not sign up before the expiration date, you must request a new code. · [employer] Access Code: [email or web address] Expires: 11/26/2018  1:50 PM 
 
4. Enter the last four digits of your Social Security Number (xxxx) and Date of Birth (mm/dd/yyyy) as indicated and click Submit. You will be taken to the next sign-up page. 5. Create a [employer] ID. This will be your [employer] login ID and cannot be changed, so think of one that is secure and easy to remember. 6. Create a [employer] password. You can change your password at any time. 7. Enter your Password Reset Question and Answer. This can be used at a later time if you forget your password. 8. Enter your e-mail address. You will receive e-mail notification when new information is available in [address] 9. Click Sign Up. You can now view and download portions of your medical record. 10. Click the Download Summary menu link to download a portable copy of your medical information. If you have questions, please visit the Frequently Asked Questions section of the [employer] website. Remember, [employer] is NOT to be used for urgent needs. For medical emergencies, dial 911. Now available from your iPhone and Android! Please provide this summary of care documentation to your next provider. Your primary care clinician is listed as Simba Mares.  If you have any questions after today's visit, please call [phone].

## 2018-08-28 NOTE — PROGRESS NOTES
70 Tri Mari MD, [address], [facility], [place]       Mignon Like, NP    JESSICA Stone, [facility]   Karlos Morales, HAN Dominguez, HAN Jacques [facility] 136    at [address]    [address] [facility] [address], [address] [facility]    [address] [facility], [facility].    [phone]    FAX: [address] [place] [address] Avenue    [address], [address]    [phone]    FAX: [phone]     Patient Care Team:  Keyanna London MD as PCP - General (Family Practice)  Janie Oreilly MD as Physician (Gastroenterology)    Patient Active Problem List   Diagnosis Code    Alcohol use disorder, moderate, in early remission ([facility] F10.21    Alcoholism in remission ([facility] F10.21    Hepatic encephalopathy ([facility] K72.90    Lower leg edema U29.7    Umbilical hernia without obstruction and without gangrene K42.9    Anemia D64.9       Man Simon returns to the [address] [address] regarding management of cirrhosis secondary to alcohol. The active problem list, all pertinent past medical history, medications, radiologic findings and laboratory findings related to the liver disorder were reviewed with the patient. The patient is a 63 y.o.  male who was found to have chronic liver disease in 2013. At that time, he was drinking 6-12 beers daily. He was told to discontinue all alcohol use so he cut back at that time and eventually stopped. He then started drinking 2 glasses of wine daily in 2017. He developed bilateral LE edema in October 2017 but 'didn't know why'. In February 2018, he fell, injured his knee and presented to the ED. He was found to have elevated liver enzymes and ascites. A paracentesis was required. He was hospitalized for 1 month.       Patient denies any alcohol use today.     The patient has developed the following complications of cirrhosis: ascites, LE edema and hepatic encephalopathy. Today, patient notes fatigue, abdominal swelling and umbilical hernia. These symptoms have not changed compared to last office visit. Today, patient denies abdominal pain, change in bowel habits, dark urine, myalgias, arthralgias, pruritus and problems concentrating. He has moderate limitations in functional activities which some can be attributed to the liver disease and to other medical problems that are not related to the liver disease. ASSESSMENT AND PLAN:  Cirrhosis  This is secondary to alcohol abuse. Liver function is normal. Liver enzymes are normal. Alk phos is elevated. Platelet count is normal.    Child class B. The MELD score is 9. The patient does not require liver transplant evaluation at this time. Ascites   Patient's abdominal is distended today but still somewhat soft. Due to hyponatremia, diuretics will remain at 1/2 step. Sodium level is better today. Ordered a paracentesis to assess and drain all fluid since diuretics are limited to low dose. The patient was counseled regarding the need to maintain sodium restriction and the types of foods containing high amounts of sodium to be avoided. Lower extremity edema  Improved with diuretics. We discussed sodium restriction, not fluid restriction. He verbalized understanding of the importance of following a sodium restricted diet. Hyponatremia  Persists but significantly improved with diuretic reduction. Will continue to follow patient closely. Screening for Esophageal varices   The patient has not recently had an EGD to screen for varices. This was scheduled but cancelled by patient. Discussed the need to get this done. Hepatic encephalopathy   Controlled on current doses of lactulose 30 mL BID. Continue. No need to restrict dietary protein at this time.       Treatment of other medical problems in patients with chronic liver disease  The patient was directed to continue all current medications at the current dosages. The patient recently consumed alcohol on a regular basis. This increases the risk of toxicity from acetaminophen. This analgesic should be avoided until the patient has been abstinent from alcohol for 6 months. The patient has cirrhosis. Patients with cirrhosis should not use NSAIDs if possible as this is associated with a higher rate of VENU. Counseling for alcohol in patients with chronic liver disease  The patient has cirrhosis and was advised to be abstinent from all alcohol including non-alcoholic beer which does contain some alcohol. The patient has not consumed alcohol since February 2018. The patient has an alcohol abuse disorder and it was suggested to enter alcohol counseling or attend AA. Anemia   This is of unclear etiology. EGD has been scheduled to assess for UGI blood loss but cancelled by patient. Anemia is stable. Encouraged patient to get this rescheduled. Vaccinations   Viral hepatitis B is recommended since the patient has no serologic evidence of previous exposure or vaccination with immunity. Vaccination for viral hepatitis A is not needed. The patient has serologic evidence of prior exposure or vaccination with immunity. Screening for Hepatocellular Carcinoma  HCC screening. AFP is normal. Currently up to date with an unremarkable US in June 2018. Next imaging will be in December 2018.     ALLERGIES  Allergies   Allergen Reactions    Aspirin Anaphylaxis     MEDICATIONS  Current Outpatient Prescriptions   Medication Sig    morphine CR (MS CONTIN) 15 mg CR tablet TAKE 1 TABLET BY MOUTH EVERY DAY    SPIRIVA WITH HANDIHALER 18 mcg inhalation capsule 1 CAPSULE ONCE A DAY INHALATION 30    torsemide (DEMADEX) 20 mg tablet TAKE 1 TABLET BY MOUTH TWICE A DAY    DULERA 200-5 mcg/actuation HFA inhaler INHALE 2 PUFFS BY MOUTH TWICE DAILY    spironolactone (ALDACTONE) 100 mg tablet Take 1 Tab by mouth daily.  magnesium oxide 400 mg cap Take 800 mg by mouth daily.  albuterol (PROVENTIL HFA, VENTOLIN HFA, PROAIR HFA) 90 mcg/actuation inhaler Take 2 Puffs by inhalation.  folic acid (FOLVITE) 1 mg tablet Take 1 mg by mouth.  lactulose (CHRONULAC) 10 gram/15 mL solution Take 20 g by mouth.  oxyCODONE (OXYIR) 5 mg capsule Take 5 mg by mouth every four (4) hours as needed.  hydrOXYzine HCl (ATARAX) 50 mg tablet Take 50 mg by mouth three (3) times daily as needed for Itching.  propranolol (INDERAL) 10 mg tablet Take  by mouth three (3) times daily.  thiamine (VITAMIN B-1) 100 mg tablet Take  by mouth daily.  furosemide (LASIX) 40 mg tablet Take  by mouth daily.  omeprazole (PRILOSEC) 20 mg capsule Take 20 mg by mouth daily.  tiotropium bromide (SPIRIVA WITH HANDIHALER IN) Take  by inhalation.  LORazepam (ATIVAN) 0.5 mg tablet Take 0.5 mg by mouth three (3) times daily as needed for Anxiety. Indications: anxiety     No current facility-administered medications for this visit. FAMILY HISTORY:  There is no family history of liver disease. Mother: alive, osteoarthritis  Father: , cardiovascular disease    SOCIAL HISTORY:  The patient is . The patient has 3 children and 2 grandchildren. The patient does not use tobacco products. The patient has previously consumed alcohol in excess, 6-12 beers x > 30 years. The patient has been abstinent from alcohol since 2018. The patient does not work. He used to work in construction. PHYSICAL EXAMINATION:  Visit Vitals    /59 (BP 1 Location: Left arm, BP Patient Position: Sitting)    Pulse (!) 104    Temp 99 °F (37.2 °C) (Tympanic)    Wt 215 lb (97.5 kg)    SpO2 97%     General: No acute distress. Eyes: Sclera anicteric. ENT: No oral lesions. Thyroid normal.  Nodes: No adenopathy. Skin: No spider angiomata. No jaundice.  No palmar erythema. Respiratory: Lungs clear to auscultation. Cardiovascular: Regular heart rate. No murmurs. No JVD. Abdomen: Umbilical hernia, no discoloration, reducible, non-tender. Distended abdomen, ascites may be present. Liver size normal to percussion/palpation. Spleen not palpable. Extremities: No edema. No muscle wasting. No gross arthritic changes. Neurologic: Alert and oriented. Cranial nerves grossly intact. No asterixis. LABORATORY STUDIES:  [facility] [place] of [address] Units 8/28/2018 7/2/2018 5/29/2018   WBC 3.4 - 10.8 x10E3/uL 8.5 8.9 9.4   HGB 13.0 - 17.7 g/dL 9.6 (L) 10.0 (L) 10.1 (L)    - 379 x10E3/uL 177 247 213   INR 0.8 - 1.2 1.1 1.1 1.1   AST 0 - 40 IU/L 35 51 (H) 46 (H)   ALT 0 - 44 IU/L 16 25 16   Alk Phos 39 - 117 IU/L 287 (H) 240 (H) 312 (H)   Bili, Total 0.0 - 1.2 mg/dL 1.0 1.3 (H) 1.4 (H)   Albumin 3.5 - 5.5 g/dL 3.6 3.6 3.1 (L)   BUN 6 - 24 mg/dL 9 14 8   Creat 0.76 - 1.27 mg/dL 0.91 1.15 0.81   Na 134 - 144 mmol/L 128 (L) 123 (L) 131 (L)   K 3.5 - 5.2 mmol/L 4.6 5.0 4.7   Cl 96 - 106 mmol/L 90 (L) 83 (L) 91 (L)   CO2 20 - 29 mmol/L 19 (L) 23 25   Glucose 65 - 99 mg/dL 84 96 97   Magnesium 1.6 - 2.3 mg/dL 1.8     Ammonia 27 - 102 ug/dL 178 (HH) 259 (HH) 201 (HH)     SEROLOGIES:  4/19/2018:   Ceruloplasmin 28. 0/Ferritin 378/Iron saturation 36%/HAV Ab positive/HBVsAb non reactive/HBsAg negative/HCV Ab <0.1/SAADIA positive/ASMA 7 (negative)/AMA 14.0 (negative)/AFP 6.4    Serologies Latest Ref Rng & Units 7/2/2018   Ferritin 30 - 400 ng/mL 456 (H)   Iron % Saturation 15 - 55 % 19     LIVER HISTOLOGY:  Not available or performed    ENDOSCOPIC PROCEDURES:  4/2018. Colonoscopy by Dr. Telly Nielsen. 5 mm polyp, hemorrhoids     RADIOLOGY:  6/2018. US of liver. Enlarged approximately 22 cm in length. Heterogeneous echotexture, no definite focal liver lesion. Main portal vein is patent. No ascites mentioned.      OTHER TESTING:  Not available or performed    All of the issues listed in the Assessment and Plan were discussed with the patient. All questions were answered. The patient expressed a clear understanding of the above.     [address] [place] [address] in 4-8 weeks     April Oscar Shoemaker NP  [facility] [place] [address]  Ph: [phone]  Fax: [phone]

## 2018-08-29 LAB
ALBUMIN SERPL-MCNC: 3.6 G/DL (ref 3.5–5.5)
ALBUMIN/GLOB SERPL: 1.1 {RATIO} (ref 1.2–2.2)
ALP SERPL-CCNC: 287 IU/L (ref 39–117)
ALT SERPL-CCNC: 16 IU/L (ref 0–44)
AMMONIA PLAS-MCNC: 178 UG/DL (ref 27–102)
AST SERPL-CCNC: 35 IU/L (ref 0–40)
BILIRUB SERPL-MCNC: 1 MG/DL (ref 0–1.2)
BUN SERPL-MCNC: 9 MG/DL (ref 6–24)
BUN/CREAT SERPL: 10 (ref 9–20)
CALCIUM SERPL-MCNC: 8.8 MG/DL (ref 8.7–10.2)
CHLORIDE SERPL-SCNC: 90 MMOL/L (ref 96–106)
CO2 SERPL-SCNC: 19 MMOL/L (ref 20–29)
CREAT SERPL-MCNC: 0.91 MG/DL (ref 0.76–1.27)
ERYTHROCYTE [DISTWIDTH] IN BLOOD BY AUTOMATED COUNT: 14.6 % (ref 12.3–15.4)
GLOBULIN SER CALC-MCNC: 3.3 G/DL (ref 1.5–4.5)
GLUCOSE SERPL-MCNC: 84 MG/DL (ref 65–99)
HCT VFR BLD AUTO: 28.8 % (ref 37.5–51)
HGB BLD-MCNC: 9.6 G/DL (ref 13–17.7)
INR PPP: 1.1 (ref 0.8–1.2)
MAGNESIUM SERPL-MCNC: 1.8 MG/DL (ref 1.6–2.3)
MCH RBC QN AUTO: 31.2 PG (ref 26.6–33)
MCHC RBC AUTO-ENTMCNC: 33.3 G/DL (ref 31.5–35.7)
MCV RBC AUTO: 94 FL (ref 79–97)
PLATELET # BLD AUTO: 177 X10E3/UL (ref 150–379)
POTASSIUM SERPL-SCNC: 4.6 MMOL/L (ref 3.5–5.2)
PROT SERPL-MCNC: 6.9 G/DL (ref 6–8.5)
PROTHROMBIN TIME: 11.3 SEC (ref 9.1–12)
RBC # BLD AUTO: 3.08 X10E6/UL (ref 4.14–5.8)
SODIUM SERPL-SCNC: 128 MMOL/L (ref 134–144)
WBC # BLD AUTO: 8.5 X10E3/UL (ref 3.4–10.8)

## 2018-08-31 LAB
COLOR FLD: NORMAL
LYMPHOCYTES NFR FLD MANUAL: NORMAL %
MACROPHAGES NFR FLD MANUAL: NORMAL %
NEUTROPHILS NFR FLD MANUAL: NORMAL %
NUC CELL # FLD: NORMAL 10*3/UL
RBC # FLD AUTO: NORMAL 10*3/UL

## 2018-09-06 ENCOUNTER — HOSPITAL ENCOUNTER (OUTPATIENT)
Dept: ULTRASOUND IMAGING | Age: 58
Discharge: HOME | End: 2018-09-06
Attending: NURSE PRACTITIONER
Payer: MEDICARE

## 2018-09-06 VITALS — TEMPERATURE: 98.9 F

## 2018-09-06 DIAGNOSIS — K42.9 UMBILICAL HERNIA WITHOUT OBSTRUCTION AND WITHOUT GANGRENE: ICD-10-CM

## 2018-09-06 DIAGNOSIS — F10.21 ALCOHOLISM IN REMISSION (HCC): ICD-10-CM

## 2018-09-06 DIAGNOSIS — K76.82 HEPATIC ENCEPHALOPATHY: ICD-10-CM

## 2018-09-06 DIAGNOSIS — F10.21 ALCOHOL USE DISORDER, MODERATE, IN EARLY REMISSION (HCC): ICD-10-CM

## 2018-09-06 DIAGNOSIS — K70.11 ASCITES DUE TO ALCOHOLIC HEPATITIS: ICD-10-CM

## 2018-09-06 PROCEDURE — 76705 ECHO EXAM OF ABDOMEN: CPT

## 2018-09-06 RX ORDER — LIDOCAINE HYDROCHLORIDE 10 MG/ML
5 INJECTION, SOLUTION EPIDURAL; INFILTRATION; INTRACAUDAL; PERINEURAL
Status: ACTIVE | OUTPATIENT
Start: 2018-09-06 | End: 2018-09-07

## 2018-10-15 ENCOUNTER — DOCUMENTATION ONLY (OUTPATIENT)
Dept: INTERNAL MEDICINE CLINIC | Age: 58
End: 2018-10-15

## 2018-10-15 NOTE — PROGRESS NOTES
Representative came in to office requesting to drop off Death Certificate, Dr. Oleg Gallagher does not work at this location. Assisted rep with information for patient and address for Dr. Oleg Gallagher.

## 2024-01-01 NOTE — PROGRESS NOTES
[address] MD, [place], [facility], [place]       Kash Richter, JESSICA Grace, [facility] PhoenixJOSE ANTONIO-BC   Nuzhat Isabel, HAN Desai, HAN Corbin [facility]    at [address], [address] [place]  22.    [phone]    FAX: [address] [place] [address], [address]    [phone]    FAX: [phone]     Patient Care Team:  Amy Byrne MD as PCP - General (Family Practice)  Greta Bedolla MD as Physician (Gastroenterology)    Patient Active Problem List   Diagnosis Code    Alcohol use disorder, moderate, in early remission ([facility] [address] [facility] F10.21    Alcoholism in remission [facility]) F10.21    [facility]atic [facility] ([facility] K72.90       The clinicians listed above have asked me to see Josh Lozano in consultation regarding management of cirrhosis secondary to alcohol. All medical records sent by the referring physicians were reviewed. The patient is a 65 y.o.  male who was found to have chronic liver disease in 2013. At that time, he was drinking 6-12 beers daily. He was told to discontinue all alcohol use so he cut back at that time and eventually stopped. He then started drinking 2 glasses of wine daily in 2017. He developed bilateral LE edema in October 2017 but didn't know why. In February 2018, he fell, injured his knee and presented to the ED. He was found to have elevated liver enzymes and ascites. A paracentesis was required. He was hospitalized for 1 month. An assessment of liver fibrosis with biopsy or elastography has not been performed. The patient has developed the following complications of cirrhosis:  ascites, LE edema and hepatic encephalopathy.     The patient notes fatigue, swelling of the abdomen with an umbilical hernia and mild swelling of the lower extremities. Today, patient denies abdominal pain, change in bowel habits, dark urine, myalgias, arthralgias, pruritus and problems concentrating. He has moderate limitations in functional activities which some can be attributed to the liver disease and to other medical problems that are not related to the liver disease. All of the issues listed in the Assessment and Plan were discussed with the patient. All questions were answered. The patient expressed a clear understanding of the above. ASSESSMENT AND PLAN:  Cirrhosis  This is secondary to alcohol abuse. Liver function is mildly depressed. Liver enzymes are mildly elevated. Platelet count is normal.  Child class B. The MELD score is 9. The patient does not require liver transplant evaluation at this time. The patient would not be a candidate for liver transplantation because of recent alcohol use in February 2018. The patient will have been abstinent for 6 months in August 2018. Ascites   There is no obvious ascites on exam today. Will increase doses of diuretics to step 1 (spironolactone was added in). The patient was counseled regarding the need to maintain sodium restriction and the types of foods containing high amounts of sodium to be avoided. Lower extremity edema  This is persistent despite current dose of diuretics. He is only on Lasix. Added spironolactone 100 mg so that patient can be on step 1 diuretics. We discussed sodium restriction, not fluid restriction. He verbalized understanding of the importance of following a sodium restricted diet. Screening for Esophageal varices   The patient has not recently had an EGD to screen for varices. Will schedule for EGD to assess for varices and need for banding. Hepatic encephalopathy   Controlled on current doses of lactulose 30 mL BID. Continue.  No need to restrict dietary protein at this time.      Treatment of other medical problems in patients with chronic liver disease  The patient was directed to continue all current medications at the current dosages. The patient recently consumed alcohol on a regular basis. This increases the risk of toxicity from acetaminophen. This analgesic should be avoided until the patient has been abstinent from alcohol for 6 months. The patient has cirrhosis. Patients with cirrhosis should not use NSAIDs if possible as this is associated with a higher rate of VENU. Counseling for alcohol in patients with chronic liver disease  The patient has cirrhosis and was advised to be abstinent from all alcohol including non-alcoholic beer which does contain some alcohol. The patient has not consumed alcohol since February 2018. The patient has an alcohol abuse disorder and it was suggested to enter alcohol counseling or attend AA. Anemia   This is of unclear etiology. Will schedule for EGD to assess for UGI blood loss. Vaccinations   Viral hepatitis B is recommended since the patient has no serologic evidence of previous exposure or vaccination with immunity. Vaccination for viral hepatitis A is not needed. The patient has serologic evidence of prior exposure or vaccination with immunity. Screening for Hepatocellular Carcinoma  HCC screening will be performed. Ultrasound will be scheduled. Discussed with patient the importance of getting this done. [address] [address] in 4-6 weeks     ALLERGIES  Allergies   Allergen Reactions    Aspirin Anaphylaxis     MEDICATIONS  Current Outpatient Prescriptions   Medication Sig    albuterol (PROVENTIL HFA, VENTOLIN HFA, PROAIR HFA) 90 mcg/actuation inhaler Take 2 Puffs by inhalation.  folic acid (FOLVITE) 1 mg tablet Take 1 mg by mouth.  lactulose (CHRONULAC) 10 gram/15 mL solution Take 20 g by mouth.  magnesium oxide 400 mg cap Take 800 mg by mouth.     oxyCODONE (OXYIR) 5 mg capsule Take 5 mg by mouth every four (4) hours as needed.  hydrOXYzine HCl (ATARAX) 50 mg tablet Take 50 mg by mouth three (3) times daily as needed for Itching.  propranolol (INDERAL) 10 mg tablet Take  by mouth three (3) times daily.  thiamine (VITAMIN B-1) 100 mg tablet Take  by mouth daily.  furosemide (LASIX) 40 mg tablet Take  by mouth daily.  omeprazole (PRILOSEC) 20 mg capsule Take 20 mg by mouth daily.  tiotropium bromide (SPIRIVA WITH HANDIHALER IN) Take  by inhalation.  LORazepam (ATIVAN) 0.5 mg tablet Take 0.5 mg by mouth three (3) times daily as needed for Anxiety. Indications: anxiety    spironolactone (ALDACTONE) 100 mg tablet Take 1 Tab by mouth daily. No current facility-administered medications for this visit. FAMILY HISTORY:  There is no family history of liver disease. Mother: alive, osteoarthritis  Father: , cardiovascular disease    SOCIAL HISTORY:  The patient is . The patient has 3 children and 2 grandchildren. The patient does not use tobacco products. The patient has previously consumed alcohol in excess, 6-12 beers x > 30 years. The patient has been abstinent from alcohol since 2018. The patient does not work. He used to work in construction. PHYSICAL EXAMINATION:  Visit Vitals    /63 (BP 1 Location: Left arm, BP Patient Position: Sitting)    Pulse 96    Temp 99 °F (37.2 °C) (Tympanic)    Wt 229 lb 12.8 oz (104.2 kg)    SpO2 97%     General: No acute distress. Eyes: Sclera anicteric. ENT: No oral lesions. Thyroid normal.  Nodes: No adenopathy. Skin: No spider angiomata. No jaundice. No palmar erythema. Respiratory: Lungs clear to auscultation. Cardiovascular: Regular heart rate. No murmurs. No JVD. Abdomen: Umbilical hernia, no discoloration, reducible, non-tender. Liver size normal to percussion/palpation. Spleen not palpable. No obvious ascites. Extremities: +1 edema. No muscle wasting.   No gross arthritic changes. Neurologic: Alert and oriented. Cranial nerves grossly intact. No asterixis. LABORATORY STUDIES:  Liver [place] of [address] Units 5/29/2018   WBC 3.4 - 10.8 x10E3/uL 9.4   HGB 13.0 - 17.7 g/dL 10.1 (L)    - 379 x10E3/uL 213   INR 0.8 - 1.2 1.1   AST 0 - 40 IU/L 46 (H)   ALT 0 - 44 IU/L 16   Alk Phos 39 - 117 IU/L 312 (H)   Bili, Total 0.0 - 1.2 mg/dL 1.4 (H)   Albumin 3.5 - 5.5 g/dL 3.1 (L)   BUN 6 - 24 mg/dL 8   Creat 0.76 - 1.27 mg/dL 0.81   Na 134 - 144 mmol/L 131 (L)   K 3.5 - 5.2 mmol/L 4.7   Cl 96 - 106 mmol/L 91 (L)   CO2 18 - 29 mmol/L 25   Glucose 65 - 99 mg/dL 97   Ammonia 27 - 102 ug/dL 201 (HH)     SEROLOGIES:  4/19/2018:   Ceruloplasmin 28. 0/Ferritin 378/Iron saturation 36%/HAV Ab positive/HBVsAb non reactive/HBsAg negative/HCV Ab <0.1/SAADIA positive/ASMA 7 (negative)/AMA 14.0 (negative)/AFP 6.4    LIVER HISTOLOGY:  Not available or performed    ENDOSCOPIC PROCEDURES:  4/2018. Colonoscopy by Dr. Nikkie Saldana.  5 mm polyp, hemorrhoids     RADIOLOGY:  Not available or performed    OTHER TESTING:  Not available or performed    April TORRI Trevino NP  [address] [address], [address] [place] [address]  Ph: [phone]  Fax: [phone] Statement Selected